# Patient Record
(demographics unavailable — no encounter records)

---

## 2024-11-12 NOTE — ASSESSMENT
[FreeTextEntry1] : The donor will complete a donor health questionnaire which will be reviewed in detail.  The donor will undergo pre donation work-up including IDM testing.  The donor understands the procedure and alternatives. He understands the 2 methods of collection of stem cells including G-CSF mobilization and bone marrow harvests and the risks and benefits of each.  The donor understands that if a collection following G-CSF mobilization fails, a bone marrow harvest maybe requested on emergency basis.  The donor understands their right to confidentiality and to being informed of any abnormal results as well as counseling about any abnormal results. The donor understands that no information regarding their health or results can be shared with the recipient without their prior approval. The donor understands the voluntary nature of the procedure and that a consent to donate can be withdrawn at any time. The donor understands the risks associated with consent withdrawal to the recipient including risk of death.  The donor was offered the assistance of an advocate. The donor will sign the appropriate consents.   Once the donor health questionnaire and work-up is reviewed, the donor eligibility determination will be completed.   We discussed the donor process, including work up in detail. We discussed central line use versus peripheral IV access, medications (patient education provided on filgrastim, along with possible side effects). We discussed the intent of mobilization and the use of apheresis to collect stem cells. We discussed the side effects and risks associated with mobilization and collection.  Plan:  - Related donor work up performed today, including labs   - HHQ completed  - CXR and EKG ordered, and donor will obtain  - Vein check, see RN note  - Tentatively plan G-CSF *** mcg (10-16 mcg/kg daily) on ***, with collection on ***

## 2024-11-12 NOTE — HISTORY OF PRESENT ILLNESS
[de-identified] : (Name) presents today for donor evaluation for a related recipient/an unrelated recipient.    -------------------PMHx-----------------   ------------------Current Meds--------   ------------------Allergies---------------   ------------------Surgical Hx:----------   ------------------Social Hx:-------------  Alcohol use:  Tabacco use:   -----------------Travel Hx:-------------   -Country  -----Region  -----Dates  -----Visitor or resident  -----Infectious disease risk    ---------------Risk factors:------------   -->Tattoos: yes/no  If yes,  Location:  Licensed professional:(yes/No), name  Date:   -->Piercing: yes/no  If yes,  Location:  Licensed professional: (yes/No), name  Date:   -->Use of drugs: (yes/No)   -->Use of needles: (yes/No)   -->Use of non-prescription medications or recreational drugs: (yes/no)   -->Other risks related to behavior: ***   -->Recent vaccinations: yes/no  If yes,  Type:  Date:   -->Last menstrual period: ***   -->History of inherited disorders: yes/no   -->History of cancer: yes/no   -->History of or current tuberculosis infection or treatment: yes/no   -->History of hematological or immunological disorders: yes/noHistory of blood transfusions: yes/no